# Patient Record
Sex: FEMALE | Race: WHITE | Employment: FULL TIME | URBAN - METROPOLITAN AREA
[De-identification: names, ages, dates, MRNs, and addresses within clinical notes are randomized per-mention and may not be internally consistent; named-entity substitution may affect disease eponyms.]

---

## 2019-06-11 ENCOUNTER — HOSPITAL ENCOUNTER (OUTPATIENT)
Dept: SURGERY | Age: 49
Discharge: HOME | End: 2019-06-11
Payer: COMMERCIAL

## 2019-06-11 VITALS
WEIGHT: 194 LBS | RESPIRATION RATE: 18 BRPM | TEMPERATURE: 98.4 F | HEIGHT: 66 IN | SYSTOLIC BLOOD PRESSURE: 141 MMHG | BODY MASS INDEX: 31.18 KG/M2 | DIASTOLIC BLOOD PRESSURE: 80 MMHG | HEART RATE: 86 BPM | OXYGEN SATURATION: 100 %

## 2019-06-11 LAB
ABO + RH BLD: NORMAL
APPEARANCE UR: CLEAR
BACTERIA URNS QL MICRO: NEGATIVE /HPF
BILIRUB UR QL: NEGATIVE
BLOOD GROUP ANTIBODIES SERPL: NORMAL
COLOR UR: ABNORMAL
EPITH CASTS URNS QL MICRO: ABNORMAL /LPF
ERYTHROCYTE [DISTWIDTH] IN BLOOD BY AUTOMATED COUNT: 14 % (ref 11.5–14.5)
GLUCOSE UR STRIP.AUTO-MCNC: NEGATIVE MG/DL
HCT VFR BLD AUTO: 31.4 % (ref 35–47)
HGB BLD-MCNC: 9.5 G/DL (ref 11.5–16)
HGB UR QL STRIP: ABNORMAL
HYALINE CASTS URNS QL MICRO: ABNORMAL /LPF (ref 0–5)
KETONES UR QL STRIP.AUTO: NEGATIVE MG/DL
LEUKOCYTE ESTERASE UR QL STRIP.AUTO: NEGATIVE
MCH RBC QN AUTO: 24.9 PG (ref 26–34)
MCHC RBC AUTO-ENTMCNC: 30.3 G/DL (ref 30–36.5)
MCV RBC AUTO: 82.4 FL (ref 80–99)
NITRITE UR QL STRIP.AUTO: NEGATIVE
NRBC # BLD: 0 K/UL (ref 0–0.01)
NRBC BLD-RTO: 0 PER 100 WBC
PH UR STRIP: 6 [PH] (ref 5–8)
PLATELET # BLD AUTO: 395 K/UL (ref 150–400)
PMV BLD AUTO: 9.8 FL (ref 8.9–12.9)
PROT UR STRIP-MCNC: NEGATIVE MG/DL
RBC # BLD AUTO: 3.81 M/UL (ref 3.8–5.2)
RBC #/AREA URNS HPF: ABNORMAL /HPF (ref 0–5)
SP GR UR REFRACTOMETRY: 1.01 (ref 1–1.03)
SPECIMEN EXP DATE BLD: NORMAL
UA: UC IF INDICATED,UAUC: ABNORMAL
UROBILINOGEN UR QL STRIP.AUTO: 0.2 EU/DL (ref 0.2–1)
WBC # BLD AUTO: 6.5 K/UL (ref 3.6–11)
WBC URNS QL MICRO: ABNORMAL /HPF (ref 0–4)

## 2019-06-11 PROCEDURE — 36415 COLL VENOUS BLD VENIPUNCTURE: CPT

## 2019-06-11 PROCEDURE — 81001 URINALYSIS AUTO W/SCOPE: CPT

## 2019-06-11 PROCEDURE — 85027 COMPLETE CBC AUTOMATED: CPT

## 2019-06-11 PROCEDURE — 86900 BLOOD TYPING SEROLOGIC ABO: CPT

## 2019-06-11 RX ORDER — MEDROXYPROGESTERONE ACETATE 10 MG/1
20 TABLET ORAL 2 TIMES DAILY
COMMUNITY
End: 2019-06-22

## 2019-06-11 RX ORDER — ALPRAZOLAM 0.5 MG/1
0.5 TABLET ORAL
COMMUNITY

## 2019-06-11 NOTE — PERIOP NOTES
[facility]  Ambulatory Surgery Unit  Pre-operative Instructions    Surgery/Procedure Date  06/21/19     Tentative Arrival Time 1020    1. On the day of your surgery/procedure, please report to the Ambulatory Surgery Unit Registration Desk and sign in at your designated time. The Ambulatory Surgery Unit is located in [facility] on the [facility] side of the [facility] across from the [address] [place] [address]. Please have all of your health insurance cards and a photo ID. 2. You must have someone with you to drive you home, as you should not drive a car for 24 hours following anesthesia. Please make arrangements for a responsible adult friend or family member to stay with you for at least the first 24 hours after your surgery. 3. Do not have anything to eat or drink (including water, gum, mints, coffee, juice) after 11:59 PM  06/20/19. This may not apply to medications prescribed by your physician. (Please note below the special instructions with medications to take the morning of surgery, if applicable.)    4. We recommend you do not drink any alcoholic beverages for 24 hours before and after your surgery. 5. Contact your surgeons office for instructions on the following medications: non-steroidal anti-inflammatory drugs (i.e. Advil, Aleve), vitamins, and supplements. (Some surgeons will want you to stop these medications prior to surgery and others may allow you to take them)   **If you are currently taking Plavix, Coumadin, Aspirin and/or other blood-thinning agents, contact your surgeon for instructions. ** Your surgeon will partner with the physician prescribing these medications to determine if it is safe to stop or if you need to continue taking. Please do not stop taking these medications without instructions from your surgeon.     6. In an effort to help prevent surgical site infection, we ask that you shower with an anti-bacterial soap (i.e. Dial/Safeguard, or the soap provided to you at your preadmission testing appointment) for 3 days prior to and on the morning of surgery, using a fresh towel after each shower. (Please begin this process with fresh bed linens.) Do not apply any lotions, powders, or deodorants after the shower on the day of your procedure. If applicable, please do not shave the operative site for 48 hours prior to surgery. 7. Wear comfortable clothes. Wear glasses instead of contacts. Do not bring any jewelry or money (other than copays or fees as instructed). Do not wear make-up, particularly mascara, the morning of your surgery. Do not wear nail polish, particularly if you are having foot /hand surgery. Wear your hair loose or down, no ponytails, buns, lor pins or clips. All body piercings must be removed. 8. You should understand that if you do not follow these instructions your surgery may be cancelled. If your physical condition changes (i.e. fever, cold or flu) please contact your surgeon as soon as possible. 9. It is important that you be on time. If a situation occurs where you may be late, or if you have any questions or problems, please call [phone].    10. Your surgery time may be subject to change. You will receive a phone call the day prior to surgery to confirm your arrival time. 11. Pediatric patients: please bring a change of clothes, diapers, bottle/sippy cup, pacifier, etc.      Special Instructions: Take all medications and inhalers, as prescribed, on the morning of surgery with a sip of water EXCEPT: lisinopril            I understand a pre-operative phone call will be made to verify my surgery time. In the event that I am not available, I give permission for a message to be left on my answering service and/or with another person?       yes         ___________________      ___________________      ________________  (Signature of Patient)          (Witness)                   (Date and Time)

## 2019-06-20 ENCOUNTER — ANESTHESIA EVENT (OUTPATIENT)
Dept: SURGERY | Age: 49
End: 2019-06-20
Payer: COMMERCIAL

## 2019-06-21 ENCOUNTER — ANESTHESIA (OUTPATIENT)
Dept: SURGERY | Age: 49
End: 2019-06-21
Payer: COMMERCIAL

## 2019-06-21 ENCOUNTER — HOSPITAL ENCOUNTER (OUTPATIENT)
Age: 49
Setting detail: OBSERVATION
Discharge: HOME | End: 2019-06-22
Attending: OBSTETRICS & GYNECOLOGY | Admitting: OBSTETRICS & GYNECOLOGY
Payer: COMMERCIAL

## 2019-06-21 DIAGNOSIS — Z90.710 S/P LAPAROSCOPIC HYSTERECTOMY: Primary | ICD-10-CM

## 2019-06-21 PROBLEM — D21.9 FIBROIDS: Status: ACTIVE | Noted: 2019-06-21

## 2019-06-21 LAB — HCG UR QL: NEGATIVE

## 2019-06-21 PROCEDURE — 77030008756 HC TU IRR SUC STRY -B: Performed by: OBSTETRICS & GYNECOLOGY

## 2019-06-21 PROCEDURE — 77030019908 HC STETH ESOPH SIMS -A: Performed by: ANESTHESIOLOGY

## 2019-06-21 PROCEDURE — 76210000037 HC AMBSU PH I REC 2 TO 2.5 HR: Performed by: OBSTETRICS & GYNECOLOGY

## 2019-06-21 PROCEDURE — 77030039266 HC ADH SKN EXOFIN S2SG -A: Performed by: OBSTETRICS & GYNECOLOGY

## 2019-06-21 PROCEDURE — 77030002933 HC SUT MCRYL J&J -A: Performed by: OBSTETRICS & GYNECOLOGY

## 2019-06-21 PROCEDURE — 77030018684: Performed by: OBSTETRICS & GYNECOLOGY

## 2019-06-21 PROCEDURE — 74011000250 HC RX REV CODE- 250

## 2019-06-21 PROCEDURE — 77030008771 HC TU NG SALEM SUMP -A: Performed by: ANESTHESIOLOGY

## 2019-06-21 PROCEDURE — 74011250636 HC RX REV CODE- 250/636: Performed by: OBSTETRICS & GYNECOLOGY

## 2019-06-21 PROCEDURE — 74011250636 HC RX REV CODE- 250/636

## 2019-06-21 PROCEDURE — 74011250637 HC RX REV CODE- 250/637: Performed by: OBSTETRICS & GYNECOLOGY

## 2019-06-21 PROCEDURE — 77030013079 HC BLNKT BAIR HGGR 3M -A: Performed by: ANESTHESIOLOGY

## 2019-06-21 PROCEDURE — 77030034850: Performed by: OBSTETRICS & GYNECOLOGY

## 2019-06-21 PROCEDURE — 77030010032 HC SCLPL DISECT HARM J&J -C: Performed by: OBSTETRICS & GYNECOLOGY

## 2019-06-21 PROCEDURE — 77030008684 HC TU ET CUF COVD -B: Performed by: ANESTHESIOLOGY

## 2019-06-21 PROCEDURE — 77030038613 HC SUT PDS STRATA SPIRL J&J -B: Performed by: OBSTETRICS & GYNECOLOGY

## 2019-06-21 PROCEDURE — 77030020702 HC ADAPT HARM DISP J&J -B: Performed by: OBSTETRICS & GYNECOLOGY

## 2019-06-21 PROCEDURE — 99218 HC RM OBSERVATION: CPT

## 2019-06-21 PROCEDURE — 77030025625 HC DEV TISS SEAL J&J -F: Performed by: OBSTETRICS & GYNECOLOGY

## 2019-06-21 PROCEDURE — 77030016151 HC PROTCTR LNS DFOG COVD -B: Performed by: OBSTETRICS & GYNECOLOGY

## 2019-06-21 PROCEDURE — 81025 URINE PREGNANCY TEST: CPT

## 2019-06-21 PROCEDURE — 77030020263 HC SOL INJ SOD CL0.9% LFCR 1000ML: Performed by: OBSTETRICS & GYNECOLOGY

## 2019-06-21 PROCEDURE — 77030018836 HC SOL IRR NACL ICUM -A: Performed by: OBSTETRICS & GYNECOLOGY

## 2019-06-21 PROCEDURE — 74011000250 HC RX REV CODE- 250: Performed by: OBSTETRICS & GYNECOLOGY

## 2019-06-21 PROCEDURE — 77030026438 HC STYL ET INTUB CARD -A: Performed by: ANESTHESIOLOGY

## 2019-06-21 PROCEDURE — 88307 TISSUE EXAM BY PATHOLOGIST: CPT

## 2019-06-21 PROCEDURE — 77030018778 HC MANIP UTER VCAR CNMD -B: Performed by: OBSTETRICS & GYNECOLOGY

## 2019-06-21 PROCEDURE — 74011250636 HC RX REV CODE- 250/636: Performed by: ANESTHESIOLOGY

## 2019-06-21 PROCEDURE — 76060000064 HC AMB SURG ANES 2 TO 2.5 HR: Performed by: OBSTETRICS & GYNECOLOGY

## 2019-06-21 PROCEDURE — 76030000004 HC AMB SURG OR TIME 2 TO 2.5: Performed by: OBSTETRICS & GYNECOLOGY

## 2019-06-21 PROCEDURE — 77030008606 HC TRCR ENDOSC KII AMR -B: Performed by: OBSTETRICS & GYNECOLOGY

## 2019-06-21 PROCEDURE — 74011250637 HC RX REV CODE- 250/637

## 2019-06-21 PROCEDURE — 77030020255 HC SOL INJ LR 1000ML BG: Performed by: OBSTETRICS & GYNECOLOGY

## 2019-06-21 RX ORDER — SODIUM CHLORIDE 0.9 % (FLUSH) 0.9 %
5-40 SYRINGE (ML) INJECTION AS NEEDED
Status: DISCONTINUED | OUTPATIENT
Start: 2019-06-21 | End: 2019-06-22 | Stop reason: HOSPADM

## 2019-06-21 RX ORDER — ALPRAZOLAM 0.5 MG/1
0.5 TABLET ORAL
Status: DISCONTINUED | OUTPATIENT
Start: 2019-06-21 | End: 2019-06-22 | Stop reason: HOSPADM

## 2019-06-21 RX ORDER — SODIUM CHLORIDE 0.9 % (FLUSH) 0.9 %
5-40 SYRINGE (ML) INJECTION EVERY 8 HOURS
Status: DISCONTINUED | OUTPATIENT
Start: 2019-06-21 | End: 2019-06-22 | Stop reason: HOSPADM

## 2019-06-21 RX ORDER — NEOSTIGMINE METHYLSULFATE 1 MG/ML
INJECTION INTRAVENOUS AS NEEDED
Status: DISCONTINUED | OUTPATIENT
Start: 2019-06-21 | End: 2019-06-21 | Stop reason: HOSPADM

## 2019-06-21 RX ORDER — MORPHINE SULFATE 10 MG/ML
2 INJECTION, SOLUTION INTRAMUSCULAR; INTRAVENOUS
Status: DISCONTINUED | OUTPATIENT
Start: 2019-06-21 | End: 2019-06-21 | Stop reason: HOSPADM

## 2019-06-21 RX ORDER — DIPHENHYDRAMINE HCL 25 MG
25 CAPSULE ORAL
Status: DISCONTINUED | OUTPATIENT
Start: 2019-06-21 | End: 2019-06-22 | Stop reason: HOSPADM

## 2019-06-21 RX ORDER — SODIUM CHLORIDE 0.9 % (FLUSH) 0.9 %
5-40 SYRINGE (ML) INJECTION EVERY 8 HOURS
Status: DISCONTINUED | OUTPATIENT
Start: 2019-06-21 | End: 2019-06-21 | Stop reason: HOSPADM

## 2019-06-21 RX ORDER — ACETAMINOPHEN 10 MG/ML
INJECTION, SOLUTION INTRAVENOUS
Status: COMPLETED
Start: 2019-06-21 | End: 2019-06-21

## 2019-06-21 RX ORDER — FENTANYL CITRATE 50 UG/ML
25 INJECTION, SOLUTION INTRAMUSCULAR; INTRAVENOUS
Status: DISCONTINUED | OUTPATIENT
Start: 2019-06-21 | End: 2019-06-21 | Stop reason: HOSPADM

## 2019-06-21 RX ORDER — LIDOCAINE HYDROCHLORIDE 10 MG/ML
0.1 INJECTION, SOLUTION EPIDURAL; INFILTRATION; INTRACAUDAL; PERINEURAL AS NEEDED
Status: DISCONTINUED | OUTPATIENT
Start: 2019-06-21 | End: 2019-06-21 | Stop reason: HOSPADM

## 2019-06-21 RX ORDER — PROPOFOL 10 MG/ML
INJECTION, EMULSION INTRAVENOUS AS NEEDED
Status: DISCONTINUED | OUTPATIENT
Start: 2019-06-21 | End: 2019-06-21 | Stop reason: HOSPADM

## 2019-06-21 RX ORDER — NALOXONE HYDROCHLORIDE 0.4 MG/ML
0.2 INJECTION, SOLUTION INTRAMUSCULAR; INTRAVENOUS; SUBCUTANEOUS AS NEEDED
Status: DISCONTINUED | OUTPATIENT
Start: 2019-06-21 | End: 2019-06-22 | Stop reason: HOSPADM

## 2019-06-21 RX ORDER — EPHEDRINE SULFATE/0.9% NACL/PF 50 MG/5 ML
SYRINGE (ML) INTRAVENOUS AS NEEDED
Status: DISCONTINUED | OUTPATIENT
Start: 2019-06-21 | End: 2019-06-21 | Stop reason: HOSPADM

## 2019-06-21 RX ORDER — SODIUM CHLORIDE, SODIUM LACTATE, POTASSIUM CHLORIDE, CALCIUM CHLORIDE 600; 310; 30; 20 MG/100ML; MG/100ML; MG/100ML; MG/100ML
25 INJECTION, SOLUTION INTRAVENOUS CONTINUOUS
Status: DISCONTINUED | OUTPATIENT
Start: 2019-06-21 | End: 2019-06-21 | Stop reason: HOSPADM

## 2019-06-21 RX ORDER — DIPHENHYDRAMINE HYDROCHLORIDE 50 MG/ML
12.5 INJECTION, SOLUTION INTRAMUSCULAR; INTRAVENOUS AS NEEDED
Status: DISCONTINUED | OUTPATIENT
Start: 2019-06-21 | End: 2019-06-21 | Stop reason: HOSPADM

## 2019-06-21 RX ORDER — SUCCINYLCHOLINE CHLORIDE 20 MG/ML
INJECTION INTRAMUSCULAR; INTRAVENOUS AS NEEDED
Status: DISCONTINUED | OUTPATIENT
Start: 2019-06-21 | End: 2019-06-21 | Stop reason: HOSPADM

## 2019-06-21 RX ORDER — GLYCOPYRROLATE 0.2 MG/ML
INJECTION INTRAMUSCULAR; INTRAVENOUS AS NEEDED
Status: DISCONTINUED | OUTPATIENT
Start: 2019-06-21 | End: 2019-06-21 | Stop reason: HOSPADM

## 2019-06-21 RX ORDER — SCOLOPAMINE TRANSDERMAL SYSTEM 1 MG/1
PATCH, EXTENDED RELEASE TRANSDERMAL
Status: COMPLETED
Start: 2019-06-21 | End: 2019-06-21

## 2019-06-21 RX ORDER — HYDROMORPHONE HYDROCHLORIDE 1 MG/ML
.2-.5 INJECTION, SOLUTION INTRAMUSCULAR; INTRAVENOUS; SUBCUTANEOUS ONCE
Status: DISCONTINUED | OUTPATIENT
Start: 2019-06-21 | End: 2019-06-21 | Stop reason: HOSPADM

## 2019-06-21 RX ORDER — ZOLPIDEM TARTRATE 5 MG/1
5 TABLET ORAL
Status: DISCONTINUED | OUTPATIENT
Start: 2019-06-21 | End: 2019-06-22 | Stop reason: HOSPADM

## 2019-06-21 RX ORDER — FENTANYL CITRATE 50 UG/ML
INJECTION, SOLUTION INTRAMUSCULAR; INTRAVENOUS AS NEEDED
Status: DISCONTINUED | OUTPATIENT
Start: 2019-06-21 | End: 2019-06-21 | Stop reason: HOSPADM

## 2019-06-21 RX ORDER — OXYCODONE AND ACETAMINOPHEN 5; 325 MG/1; MG/1
1 TABLET ORAL
Status: DISCONTINUED | OUTPATIENT
Start: 2019-06-21 | End: 2019-06-22 | Stop reason: HOSPADM

## 2019-06-21 RX ORDER — SODIUM CHLORIDE 0.9 % (FLUSH) 0.9 %
5-40 SYRINGE (ML) INJECTION AS NEEDED
Status: DISCONTINUED | OUTPATIENT
Start: 2019-06-21 | End: 2019-06-21 | Stop reason: HOSPADM

## 2019-06-21 RX ORDER — KETOROLAC TROMETHAMINE 30 MG/ML
INJECTION, SOLUTION INTRAMUSCULAR; INTRAVENOUS
Status: DISPENSED
Start: 2019-06-21 | End: 2019-06-21

## 2019-06-21 RX ORDER — ONDANSETRON 2 MG/ML
INJECTION INTRAMUSCULAR; INTRAVENOUS AS NEEDED
Status: DISCONTINUED | OUTPATIENT
Start: 2019-06-21 | End: 2019-06-21 | Stop reason: HOSPADM

## 2019-06-21 RX ORDER — HYDROMORPHONE HYDROCHLORIDE 2 MG/ML
INJECTION, SOLUTION INTRAMUSCULAR; INTRAVENOUS; SUBCUTANEOUS AS NEEDED
Status: DISCONTINUED | OUTPATIENT
Start: 2019-06-21 | End: 2019-06-21 | Stop reason: HOSPADM

## 2019-06-21 RX ORDER — OXYCODONE AND ACETAMINOPHEN 5; 325 MG/1; MG/1
1 TABLET ORAL
Status: DISCONTINUED | OUTPATIENT
Start: 2019-06-21 | End: 2019-06-21 | Stop reason: HOSPADM

## 2019-06-21 RX ORDER — LIDOCAINE HYDROCHLORIDE 20 MG/ML
INJECTION, SOLUTION EPIDURAL; INFILTRATION; INTRACAUDAL; PERINEURAL AS NEEDED
Status: DISCONTINUED | OUTPATIENT
Start: 2019-06-21 | End: 2019-06-21 | Stop reason: HOSPADM

## 2019-06-21 RX ORDER — KETOROLAC TROMETHAMINE 30 MG/ML
30 INJECTION, SOLUTION INTRAMUSCULAR; INTRAVENOUS
Status: COMPLETED | OUTPATIENT
Start: 2019-06-21 | End: 2019-06-21

## 2019-06-21 RX ORDER — SCOLOPAMINE TRANSDERMAL SYSTEM 1 MG/1
1 PATCH, EXTENDED RELEASE TRANSDERMAL ONCE
Status: DISCONTINUED | OUTPATIENT
Start: 2019-06-21 | End: 2019-06-21 | Stop reason: HOSPADM

## 2019-06-21 RX ORDER — IBUPROFEN 400 MG/1
800 TABLET ORAL EVERY 8 HOURS
Status: DISCONTINUED | OUTPATIENT
Start: 2019-06-21 | End: 2019-06-22 | Stop reason: HOSPADM

## 2019-06-21 RX ORDER — PROCHLORPERAZINE EDISYLATE 5 MG/ML
5 INJECTION INTRAMUSCULAR; INTRAVENOUS
Status: DISCONTINUED | OUTPATIENT
Start: 2019-06-21 | End: 2019-06-22 | Stop reason: HOSPADM

## 2019-06-21 RX ORDER — LISINOPRIL 5 MG/1
10 TABLET ORAL DAILY
Status: DISCONTINUED | OUTPATIENT
Start: 2019-06-22 | End: 2019-06-22 | Stop reason: HOSPADM

## 2019-06-21 RX ORDER — ACETAMINOPHEN 10 MG/ML
1000 INJECTION, SOLUTION INTRAVENOUS ONCE
Status: COMPLETED | OUTPATIENT
Start: 2019-06-21 | End: 2019-06-21

## 2019-06-21 RX ORDER — CEFAZOLIN SODIUM/WATER 2 G/20 ML
2 SYRINGE (ML) INTRAVENOUS ONCE
Status: DISCONTINUED | OUTPATIENT
Start: 2019-06-21 | End: 2019-06-21 | Stop reason: HOSPADM

## 2019-06-21 RX ORDER — BUPIVACAINE HYDROCHLORIDE AND EPINEPHRINE 5; 5 MG/ML; UG/ML
INJECTION, SOLUTION EPIDURAL; INTRACAUDAL; PERINEURAL AS NEEDED
Status: DISCONTINUED | OUTPATIENT
Start: 2019-06-21 | End: 2019-06-21 | Stop reason: HOSPADM

## 2019-06-21 RX ORDER — ROCURONIUM BROMIDE 10 MG/ML
INJECTION, SOLUTION INTRAVENOUS AS NEEDED
Status: DISCONTINUED | OUTPATIENT
Start: 2019-06-21 | End: 2019-06-21 | Stop reason: HOSPADM

## 2019-06-21 RX ORDER — CEFAZOLIN SODIUM 1 G/3ML
INJECTION, POWDER, FOR SOLUTION INTRAMUSCULAR; INTRAVENOUS AS NEEDED
Status: DISCONTINUED | OUTPATIENT
Start: 2019-06-21 | End: 2019-06-21 | Stop reason: HOSPADM

## 2019-06-21 RX ORDER — MIDAZOLAM HYDROCHLORIDE 1 MG/ML
INJECTION, SOLUTION INTRAMUSCULAR; INTRAVENOUS AS NEEDED
Status: DISCONTINUED | OUTPATIENT
Start: 2019-06-21 | End: 2019-06-21 | Stop reason: HOSPADM

## 2019-06-21 RX ADMIN — ACETAMINOPHEN 1000 MG: 10 INJECTION, SOLUTION INTRAVENOUS at 06:59

## 2019-06-21 RX ADMIN — SUCCINYLCHOLINE CHLORIDE 160 MG: 20 INJECTION INTRAMUSCULAR; INTRAVENOUS at 07:35

## 2019-06-21 RX ADMIN — Medication 10 ML: at 22:17

## 2019-06-21 RX ADMIN — OXYCODONE AND ACETAMINOPHEN 1 TABLET: 5; 325 TABLET ORAL at 22:16

## 2019-06-21 RX ADMIN — Medication 20 MG: at 08:22

## 2019-06-21 RX ADMIN — GLYCOPYRROLATE 0.5 MG: 0.2 INJECTION INTRAMUSCULAR; INTRAVENOUS at 09:29

## 2019-06-21 RX ADMIN — KETOROLAC TROMETHAMINE 30 MG: 30 INJECTION, SOLUTION INTRAMUSCULAR at 10:04

## 2019-06-21 RX ADMIN — IBUPROFEN 800 MG: 400 TABLET, FILM COATED ORAL at 12:45

## 2019-06-21 RX ADMIN — HYDROMORPHONE HYDROCHLORIDE 0.2 MG: 2 INJECTION, SOLUTION INTRAMUSCULAR; INTRAVENOUS; SUBCUTANEOUS at 09:30

## 2019-06-21 RX ADMIN — SODIUM CHLORIDE, SODIUM LACTATE, POTASSIUM CHLORIDE, AND CALCIUM CHLORIDE 25 ML/HR: 600; 310; 30; 20 INJECTION, SOLUTION INTRAVENOUS at 06:59

## 2019-06-21 RX ADMIN — ROCURONIUM BROMIDE 10 MG: 10 INJECTION, SOLUTION INTRAVENOUS at 07:33

## 2019-06-21 RX ADMIN — MEPERIDINE HYDROCHLORIDE 12.5 MG: 25 INJECTION, SOLUTION INTRAMUSCULAR; INTRAVENOUS; SUBCUTANEOUS at 10:59

## 2019-06-21 RX ADMIN — ROCURONIUM BROMIDE 10 MG: 10 INJECTION, SOLUTION INTRAVENOUS at 08:17

## 2019-06-21 RX ADMIN — CEFAZOLIN SODIUM 2 G: 1 INJECTION, POWDER, FOR SOLUTION INTRAMUSCULAR; INTRAVENOUS at 07:35

## 2019-06-21 RX ADMIN — FENTANYL CITRATE 25 MCG: 50 INJECTION, SOLUTION INTRAMUSCULAR; INTRAVENOUS at 10:07

## 2019-06-21 RX ADMIN — FENTANYL CITRATE 100 MCG: 50 INJECTION, SOLUTION INTRAMUSCULAR; INTRAVENOUS at 07:33

## 2019-06-21 RX ADMIN — IBUPROFEN 800 MG: 400 TABLET, FILM COATED ORAL at 18:09

## 2019-06-21 RX ADMIN — ZOLPIDEM TARTRATE 5 MG: 5 TABLET ORAL at 22:16

## 2019-06-21 RX ADMIN — HYDROMORPHONE HYDROCHLORIDE 0.4 MG: 2 INJECTION, SOLUTION INTRAMUSCULAR; INTRAVENOUS; SUBCUTANEOUS at 07:44

## 2019-06-21 RX ADMIN — OXYCODONE AND ACETAMINOPHEN 1 TABLET: 5; 325 TABLET ORAL at 17:28

## 2019-06-21 RX ADMIN — OXYCODONE AND ACETAMINOPHEN 1 TABLET: 5; 325 TABLET ORAL at 12:45

## 2019-06-21 RX ADMIN — ONDANSETRON 4 MG: 2 INJECTION INTRAMUSCULAR; INTRAVENOUS at 07:51

## 2019-06-21 RX ADMIN — Medication 10 ML: at 10:58

## 2019-06-21 RX ADMIN — LIDOCAINE HYDROCHLORIDE 20 MG: 20 INJECTION, SOLUTION EPIDURAL; INFILTRATION; INTRACAUDAL; PERINEURAL at 07:33

## 2019-06-21 RX ADMIN — MIDAZOLAM HYDROCHLORIDE 2 MG: 1 INJECTION, SOLUTION INTRAMUSCULAR; INTRAVENOUS at 07:27

## 2019-06-21 RX ADMIN — NEOSTIGMINE METHYLSULFATE 3 MG: 1 INJECTION INTRAVENOUS at 09:29

## 2019-06-21 RX ADMIN — ROCURONIUM BROMIDE 30 MG: 10 INJECTION, SOLUTION INTRAVENOUS at 07:51

## 2019-06-21 RX ADMIN — FENTANYL CITRATE 25 MCG: 50 INJECTION, SOLUTION INTRAMUSCULAR; INTRAVENOUS at 10:14

## 2019-06-21 RX ADMIN — ALPRAZOLAM 0.5 MG: 0.5 TABLET ORAL at 18:10

## 2019-06-21 RX ADMIN — PROPOFOL 150 MG: 10 INJECTION, EMULSION INTRAVENOUS at 07:35

## 2019-06-21 NOTE — PERIOP NOTES
Dannielle Alexander  1970  [id number]    Situation:  Verbal report given from: Silke Gates CRNA  Procedure: Procedure(s):  TOTAL LAPAROSCOPIC HYSTERECTOMY AND BILATERAL SALPINGECTOMY    Background:    Preoperative diagnosis: FIBROIDS, MENORRHAGIA    Postoperative diagnosis: FIBROIDS, MENORRHAGIA    :  Dr. Gris Maldonado    Assistant(s): Circ-1: Emperatriz Aguilar RN  Scrub Tech-1: Laura Parikh  Scrub Tech-2: Rehana Angulo    Specimens:   ID Type Source Tests Collected by Time Destination   1 : Uterus, cervix, bilateral fallopian tubes Preservative Uterus with Bilateral Fallopian Tubes  Cee Thayer MD 6/21/2019 0932 Pathology       Assessment:  Intra-procedure medications         Anesthesia gave intra-procedure sedation and medications, see anesthesia flow sheet     Intravenous fluids: LR@ KVO     Vital signs stable       Recommendation:    Permission to share finding with Emilio Campbell : yes

## 2019-06-21 NOTE — PERIOP NOTES
Permission received to review discharge instructions and discuss private health information with  Domingo Randhawa

## 2019-06-21 NOTE — H&P
Pre-operative Evaluation / History & Physical    Sent From: Sent To: [address]   Phone: [phone] Fax: [phone]      Patient Information  Patient Name Franca Argueta Sex F    1970 Age 51yo   Address [address]  [place], [place] Phone H: [phone]  W: [phone]  M: [phone]   Primary Insurance [employer] (PPO)  ID: [id number]  Group: [id number]  Policy Gill: Raimundo PAUL None recorded. Pre-Op Visit and Medical History  Chief Complaint surgical consult -hyst   History of Present Illness Patient presents for surgical consult for hyst per menorrhagia/fibroids/polyps, reporting bleeding with variable flow since December. Multiple 3-5 cm fibroids and polyp noted on biopsy. Past Medical History Discussed Past Medical History  Anemia: Y  Anxiety Disorder: Y  Dermatologic Disorder: Y - rosacea  Hypertension (high blood pressure): Y  Pulmonary / Lung Disease: Y - sarcoidosis   Surgical History Reviewed Surgical History     Caesarean Section   Cholecystectomy (Gallbladder)   Tonsillectomy   Gynecological / Obstetrical History Reviewed GYN History  Date of LMP: 2019 (Notes: irregular). Duration of Flow (days): 5. Flow: Heavy. Menses Monthly: Y. Menstrual Cramps: moderate. Date of Last Pap Smear: 10/25/2018 (Notes: ASCUS). Date of HPV testing: 10/25/2018 (Notes: negative). Abnormal Pap: N. Sexually Active?: Y.  STIs/STDs: N.  Current Birth Control Method: Condoms. Date of Last Mammogram: 10/25/2018 (Notes: BIRADS 1). Mammogram Result: (Notes: 2018 mammo TC 21%). Fibroids: Yes (Notes: uterine fibroids & polyps).    Social History Discussed Social History  OB/GYN Social History  Smoking Status: Never smoker  Marital status:   Are you working: Yes  Occupation: [employer]  On average, how many days per week do you engage in moderate to strenuous EXERCISE (like walking fast, running, jogging, dancing, swimming, biking, or other activities that cause a light or heavy sweat)? : 3  works retail - cosmetics   Family History Discussed Family History    Maternal Grandmother                  - Malignant tumor of breast                    Allergies List Reviewed Allergies     MINOCYCLINE    PREDNISONE       Medications Reviewed Medications     Allergy Sinus Headache (PE) 02/12/19   entered    Augmentin  Internal Note: sinus infection 02/28/19   entered    Aviane 0.1 mg-20 mcg tablet  Take 1 tablet(s) every day by oral route. 12/20/18   prescribed    ferrous sulfate 325 mg (65 mg iron) tablet  1 - 2 po daily, start 04/10/2013 04/10/13   filled    lisinopril 10 mg tablet  Prescribed by non 606/706 Saw Duong MD, start 05/13/2014 05/13/14   filled    Xanax 0.5 mg tablet  Take 1 tablet(s) as needed by oral route. 11/27/18   entered       Review of Systems ROS as noted in the HPI   Vital Signs Ht:                  5 ft 5.5 in (166.37 cm) 02/28/2019 02:02 pm Wt:                  191 lbs (86.64 kg) 02/28/2019 02:03 pm BMI:                  31.3 02/28/2019 02:03 pm   BP:                  128/84 02/28/2019 02:08 pm          Physical Exam Patient is a 48-year-old female. Constitutional: General Appearance: well developed and nourished and pleasant. Level of Distress: no acute distress. Ambulation: ambulating normally. Head: Head: normal scalp and examination of the face and normocephalic, atraumatic, and no temporal wasting. Eyes: External Eye no discharge or eye discharge, proptosis, or ptosis. Sclera: non-icteric. Extraocular Movements extraocular movements intact. Ears, Nose, Mouth, Throat: Ears normal hearing. Nose: no external nose lesions. Neck: Appearance trachea midline. Thyroid: no enlargement. Lungs / Chest: Respiratory effort: unlabored. Cardiovascular: Extremities: no clubbing, cyanosis, or edema.     Abdomen: Inspection and Palpation: no tenderness, guarding, masses, or rebound tenderness and soft and non-distended. Hernia: none palpable. Lymphatics: General Lymphatics: no lymphadenopathy. Extremities: Extremities: no swelling or inflammation of extremities. Musculoskeletal System: General Musculoskeletal full range of motion. Skin: General Skin no rash or suspicious lesions. Neurologic: Gait and Station: normal gait and station. Cranial Nerves: grossly intact. Mental Status Exam: Orientation oriented to person, place, and time. Affect: appropriate affect. Language and Speech: normal speech and comprehension. Lab Results    Assessment and Plan 1. Uterine leiomyoma -  Desires definitive management. We will plan total laparoscopic hysterectomy. We discussed the risks and benefits of ovarian conservation and the pt would like to have her ovaries left in place as long as they are normal. She understands that oopherectomy would mean immediate menopause. We also discussed supracervical vs total laparoscopic hyst, and she would like to proceed with the total procedure. She understands that if surgical complications necessitate supracervical hyst, that there is a risk of cyclic menstrual bleeding. I reviewed with the patient indications, alternatives, risks, and benefits of proposed procedure, the risks of which include injury to bowel, bladder, nerves, blood vessels, or ureters, injury to any other intraabdominal structure, risk of bleeding and infection, and inherent risks of anesthesia, including death. The patient indicates understanding of these risks, and agrees to the proposed procedure. She is instructed to stay NPO after midnight the night before surgery.   D25.9: Leiomyoma of uterus, unspecified      Return to Office  None recorded   Current Problems (Diagnoses) Reviewed Problems     Uterine leiomyoma - Onset: 11/27/2018   Hyperthyroidism - Onset: 02/12/2019   Polyp of corpus uteri - Onset: 01/28/2019   Menorrhagia - Onset: 06/17/2015   Gynecologic examination - Onset: 11/27/2018   Discharge from left nipple - Onset: 02/12/2019         Electronically Signed by: Estrellita Snellen, MD    _____________________________________________   Ordered/Documented by:   Visit Date: 02/28/2019    The History and Physical is reviewed today. The patient is seen and examined and no changes are required. The pt would like to stay in house tonight because her short term disability won't kick in right away otherwise.    Yaz Kuhn MD  6/21/2019  7:22 AM

## 2019-06-21 NOTE — PERIOP NOTES
0951-Pt. To pacu, sleepy but arousable. Vss. Denies pain but c/o pressure and urge to void. Explained to pt. That after catheter is removed the urge to void is normal.  Abdomen w/3 trocar sites w/skin glue intact. peripad w/scant serosanguinous drainage. 1007-Pt. C/o pressure and cramping to abdomen level 5/10. Warm blanket applied to abdomen. Medicated w/toradol and 25mcg fentanyl. Will monitor. 1014-Pt. States pain is still 5/10 and now cramping in back. Medicated w/fentanyl 25mcg iv. Will monitor. 1020-Pt. Resting w/eyes closed. States pain is now down to a 3 or 4. Will monitor. 1045-Pt. States cramping is better, now 3/10. Still feels the urge to void. Placed on bedpan. 1055-Pt. Unable to void at this time. 1059-Pt. C/o increasing cramps, level 5/10. Medicated w/demerol 12.5mg iv. Will monitor.  at bedside. 1115-Pt. States pain is better, now 3/10. Will monitor.

## 2019-06-21 NOTE — PERIOP NOTES
Patient states that  Aliyah Runner will be with them for at least 24 hours following today's procedure.

## 2019-06-21 NOTE — PERIOP NOTES
Pt. Transported to room 2114 via stretcher w/all belongings. Assisted to bathroom to attempt to void, unsuccessful. Bedside report given to [facility].

## 2019-06-21 NOTE — OP NOTES
Operative Note    Patient ID:   Name: Didi Mac Record Number: [id number]    YOB: 1970    Preoperative Diagnosis: FIBROIDS, MENORRHAGIA    Postoperative Diagnosis: FIBROIDS, MENORRHAGIA    Surgeon:  Mary Jane Whitt MD     Assistant:  OR assistant    Anesthesia: General    Procedure: Total Laparoscopic Hysterectomy, bilateral salpingectomy    Findings: markedly enlarged fibroid uterus, normal appearing tubes and ovaries. Normal pelvic and abdominal anatomy otherwise. Estimated Blood Loss: 25cc    Drains: none    Pathology /Specimens:     ID Type Source Tests Collected by Time Destination   1 : Uterus, cervix, bilateral fallopian tubes Preservative Uterus with Bilateral Fallopian Tubes  Cee Thayer MD 6/21/2019 0932 Pathology       DVT Prophylaxis: [facility]    Antibiotic Prophylaxis: Ancef    After understanding the risks, benefits, and alternatives to the proposed procedure, the patient was taken to the operating room, where she was administered general anesthesia in the supine position. She was then placed in the dorsal lithotomy position and prepped and draped in usual sterile fashion. A dodge catheter is placed. A sidearm speculum is introduced into the vagina. There cervix is grasped with a single tooth tenaculum. A Memetalesare uterine manipulator is placed and the balloon inflated with air. Gloves are changed and attention is turned to the abdomen. A Murphy's point incision is made, and access is gained using the optiview technique with a 5 mm trocar. Pneumoperitoneum is obtained and intraabdominal placement is verified. There was no bleeding and no evidence of injury to the bowel. 5 mm incisions were then made in the left and right lower quadrants and 5 mm ports placed in each of those under direct visualization. Findings were noted as above. The articulating EnSeal was used. The ureters were identified on either side.  On the left hand side, the tube and uteroovarian ligament was clamped and divided using the EnSeal. The dissection was then taken down through the round ligament. The anterior peritoneum was incised at the midline. The bladder was dissected off the lower uterine segment and cervix. Posterior peritoneum was taken down, skeletonizing the uterine arteries. The uterine arteries were clamped, coagulated and cut across the ascending branches using EnSeal. Cardinal ligament was developed. The same steps were followed on the right side. The fornices of the vagina are identified via the VCare cup. Colpotomy was made with the Harmonic Hook and carried around the VCare cup. When the specimen was free, it was delivered through the vagina. The bilateral mesosalpinges are divided with the EnSeal to remove both fallopian tubes which are then withdrawn through the trocar. The vaginal cuff was then closed with a running suture of 2-0 Stratafix with care taken to incorporate the angles of the cuff on each side. A second stitch of the same material is doubled back across the length of the closure. Hemostasis was achieved. The pelvis was irrigated with copious amounts of saline and suctioned away. Hemostasis was assured. The left and right lower trocars were removed under direct visualization. Once the pneumoperitoneum was completely reduced and hemostasis is still effective, the final trocar was removed. Skin of all incisions was closed with 4-0 Monocryl in a subcuticular closure. 0.5% Marcaine with epinephrine is injected at each incision site. Dermabond was applied to the skin. All sponge, lap, needle, and instrument counts were correct times two. Subsequently, the patient was cleaned up, the dodge was removed, and she was returned to the supine position, awakened, and taken to the recovery room in stable condition.      Signed By: Sherie Scott MD

## 2019-06-21 NOTE — PERIOP NOTES
Manju Paws applied at this time and set to appropriate temperature. Patient given remote and instructed on use. Will continue to monitor.

## 2019-06-21 NOTE — ANESTHESIA PREPROCEDURE EVALUATION
Relevant Problems   No relevant active problems       Anesthetic History     PONV          Review of Systems / Medical History  Patient summary reviewed, nursing notes reviewed and pertinent labs reviewed    Pulmonary  Within defined limits              Comments: Sarcoidosis   Neuro/Psych         Psychiatric history (anxiety)     Cardiovascular    Hypertension                   GI/Hepatic/Renal  Within defined limits              Endo/Other        Anemia     Other Findings   Comments: Menorrhagia  fibroids           Physical Exam    Airway  Mallampati: I  TM Distance: 4 - 6 cm  Neck ROM: normal range of motion   Mouth opening: Normal     Cardiovascular    Rhythm: regular  Rate: normal         Dental    Dentition: Caps/crowns  Comments: On molars   Pulmonary  Breath sounds clear to auscultation               Abdominal  GI exam deferred       Other Findings            Anesthetic Plan    ASA: 2  Anesthesia type: general    Monitoring Plan: BIS      Induction: Intravenous  Anesthetic plan and risks discussed with: Patient      Orformev IV preop.  Scopolamine patch/ PONV prophylaxis

## 2019-06-21 NOTE — ANESTHESIA POSTPROCEDURE EVALUATION
Procedure(s):  TOTAL LAPAROSCOPIC HYSTERECTOMY AND BILATERAL SALPINGECTOMY. general    Anesthesia Post Evaluation      Multimodal analgesia: multimodal analgesia used between 6 hours prior to anesthesia start to PACU discharge  Patient location during evaluation: bedside  Patient participation: complete - patient participated  Level of consciousness: awake  Pain score: 3  Airway patency: patent  Anesthetic complications: no  Cardiovascular status: acceptable  Respiratory status: acceptable  Hydration status: acceptable  Comments: Planned admission.   Post anesthesia nausea and vomiting:  none      Vitals Value Taken Time   /68 6/21/2019 11:28 AM   Temp 36.9 °C (98.5 °F) 6/21/2019 10:31 AM   Pulse 66 6/21/2019 11:28 AM   Resp 13 6/21/2019 11:28 AM   SpO2 100 % 6/21/2019 11:28 AM

## 2019-06-22 VITALS
TEMPERATURE: 99.7 F | OXYGEN SATURATION: 97 % | HEIGHT: 66 IN | HEART RATE: 90 BPM | BODY MASS INDEX: 30.44 KG/M2 | DIASTOLIC BLOOD PRESSURE: 85 MMHG | WEIGHT: 189.38 LBS | SYSTOLIC BLOOD PRESSURE: 144 MMHG | RESPIRATION RATE: 16 BRPM

## 2019-06-22 LAB
ERYTHROCYTE [DISTWIDTH] IN BLOOD BY AUTOMATED COUNT: 14.6 % (ref 11.5–14.5)
HCT VFR BLD AUTO: 28 % (ref 35–47)
HGB BLD-MCNC: 8.5 G/DL (ref 11.5–16)
MCH RBC QN AUTO: 25 PG (ref 26–34)
MCHC RBC AUTO-ENTMCNC: 30.4 G/DL (ref 30–36.5)
MCV RBC AUTO: 82.4 FL (ref 80–99)
NRBC # BLD: 0 K/UL (ref 0–0.01)
NRBC BLD-RTO: 0 PER 100 WBC
PLATELET # BLD AUTO: 326 K/UL (ref 150–400)
PMV BLD AUTO: 9.9 FL (ref 8.9–12.9)
RBC # BLD AUTO: 3.4 M/UL (ref 3.8–5.2)
WBC # BLD AUTO: 8.1 K/UL (ref 3.6–11)

## 2019-06-22 PROCEDURE — 99218 HC RM OBSERVATION: CPT

## 2019-06-22 PROCEDURE — 36415 COLL VENOUS BLD VENIPUNCTURE: CPT

## 2019-06-22 PROCEDURE — 85027 COMPLETE CBC AUTOMATED: CPT

## 2019-06-22 PROCEDURE — 77010033678 HC OXYGEN DAILY

## 2019-06-22 PROCEDURE — 94760 N-INVAS EAR/PLS OXIMETRY 1: CPT

## 2019-06-22 PROCEDURE — 74011250637 HC RX REV CODE- 250/637: Performed by: OBSTETRICS & GYNECOLOGY

## 2019-06-22 RX ORDER — IBUPROFEN 800 MG/1
800 TABLET ORAL
Qty: 30 TAB | Refills: 1 | Status: SHIPPED | OUTPATIENT
Start: 2019-06-22

## 2019-06-22 RX ORDER — OXYCODONE AND ACETAMINOPHEN 5; 325 MG/1; MG/1
1 TABLET ORAL
Qty: 24 TAB | Refills: 0 | Status: SHIPPED | OUTPATIENT
Start: 2019-06-22 | End: 2019-06-27

## 2019-06-22 RX ADMIN — OXYCODONE AND ACETAMINOPHEN 1 TABLET: 5; 325 TABLET ORAL at 08:13

## 2019-06-22 RX ADMIN — LISINOPRIL 10 MG: 5 TABLET ORAL at 08:13

## 2019-06-22 RX ADMIN — IBUPROFEN 800 MG: 400 TABLET, FILM COATED ORAL at 02:00

## 2019-06-22 RX ADMIN — Medication 10 ML: at 05:51

## 2019-06-22 RX ADMIN — IBUPROFEN 800 MG: 400 TABLET, FILM COATED ORAL at 11:00

## 2019-06-22 NOTE — DISCHARGE INSTRUCTIONS
Laparoscopic Hysterectomy: What to Expect at [facility]    A hysterectomy is surgery to take out the uterus. In some cases, the ovaries and fallopian tubes also are taken out at the same time. You can expect to feel better and stronger each day, but you may need pain medicine for a week or two. You may get tired easily or have less energy than usual. The tiredness may last for several weeks after surgery. You will probably notice that your belly is swollen and puffy. This is common. The swelling will take several weeks to go down. You may take about 4 to 6 weeks to fully recover. It is important to avoid lifting while you are recovering so that you can heal.  This care sheet gives you a general idea about how long it will take for you to recover. But each person recovers at a different pace. Follow the steps below to get better as quickly as possible. How can you care for yourself at home? Activity    · Rest when you feel tired.     · Be active. Walking is a good choice.     · Allow the area to heal. Don't move quickly or lift anything heavy until you are feeling better.     · You may shower 24 to 48 hours after surgery, if your doctor okays it. Pat the incision dry. Do not take a bath for the first 2 weeks, or until your doctor tells you it is okay.     · Ask your doctor when it is okay for you to have sex. Diet    · You can eat your normal diet. If your stomach is upset, try bland, low-fat foods like plain rice, broiled chicken, toast, and yogurt.     · If your bowel movements are not regular right after surgery, try to avoid constipation and straining. Drink plenty of water. Your doctor may suggest fiber, a stool softener, or a mild laxative. Medicines    · Your doctor will tell you if and when you can restart your medicines.  He or she will also give you instructions about taking any new medicines.     · If you take blood thinners, such as warfarin (Coumadin), clopidogrel (Plavix), or aspirin, be sure to talk to your doctor. He or she will tell you if and when to start taking those medicines again. Make sure that you understand exactly what your doctor wants you to do.     · Be safe with medicines. Read and follow all instructions on the label. ? If the doctor gave you a prescription medicine for pain, take it as prescribed. ? If you are not taking a prescription pain medicine, ask your doctor if you can take an over-the-counter medicine.     · If your doctor prescribed antibiotics, take them as directed. Do not stop taking them just because you feel better. You need to take the full course of antibiotics. Incision care    · You may have stitches over the cuts (incisions) the doctor made in your belly.     · If you have strips of tape on the cut (incision) the doctor made, leave the tape on for a week or until it falls off.     · Wash the area daily with warm, soapy water, and pat it dry. Don't use hydrogen peroxide or alcohol. They can slow healing.     · You may cover the area with a gauze bandage if it oozes fluid or rubs against clothing.     · Change the bandage every day. Other instructions    · You may have some light vaginal bleeding. Wear sanitary pads if needed. Do not douche or use tampons.     · Don't have sex until the doctor says it is okay. Follow-up care is a key part of your treatment and safety. Be sure to make and go to all appointments, and call your doctor if you are having problems. It's also a good idea to know your test results and keep a list of the medicines you take. When should you call for help? Call 911 anytime you think you may need emergency care.  For example, call if:    · You passed out (lost consciousness).     · You have chest pain, are short of breath, or cough up blood.    Call your doctor now or seek immediate medical care if:    · You have pain that does not get better after you take pain medicine.     · You cannot pass stoolsl or gas.     · You have vaginal discharge that has increased in amount or smells bad.     · You are sick to your stomach or cannot drink fluids.     · You have loose stitches, or your incision comes open.     · Bright red blood has soaked through the bandage over your incision.     · You have signs of infection, such as:  ? Increased pain, swelling, warmth, or redness. ? Red streaks leading from the incision. ? Pus draining from the incision. ? A fever.     · You have bright red vaginal bleeding that soaks one or more pads in an hour, or you have large clots.     · You have signs of a blood clot in your leg (called a deep vein thrombosis), such as:  ? Pain in your calf, back of the knee, thigh, or groin. ? Redness and swelling in your leg or groin.    Watch closely for changes in your health, and be sure to contact your doctor if you have any problems. Where can you learn more? Go to http://pritesh-katlyn.info/. Enter Q131 in the search box to learn more about \"Laparoscopic Hysterectomy: What to Expect at Home. \"  Current as of: May 14, 2018  Content Version: 11.9  © 1932-1241 [employer]. Care instructions adapted under license by [employer] (which disclaims liability or warranty for this information).  If you have questions about a medical condition or this instruction, always ask your healthcare professional. [address] any warranty or liability for your use of this information.

## 2019-06-22 NOTE — PROGRESS NOTES
Gynecology Post Operative Note    George Pruett    Assessment: Doing well post-op    Plan: Discharge home today with: Medications: ibuprofen, percocet and medications prior to admission Follow up: 2 weeks Diet: as tolerated Activity: no heavy lifting and pelvic rest    Post-op day 1 from Procedure(s):  TOTAL LAPAROSCOPIC HYSTERECTOMY AND BILATERAL SALPINGECTOMY  She is without significant complaints. Pain controlled on current medication. Voiding without difficulty. Patient is passing flatus. She is is tolerating her diet. Orders/Charges: Medium    Vitals:  Visit Vitals  /85   Pulse 90   Temp 99.7 °F (37.6 °C)   Resp 16   Ht 5' 6\" (1.676 m)   Wt 85.9 kg (189 lb 6 oz)   SpO2 97%   BMI 30.57 kg/m²     Temp (24hrs), Av.8 °F (37.1 °C), Min:98 °F (36.7 °C), Max:99.7 °F (37.6 °C)      Last 24hr Input/Output:    Intake/Output Summary (Last 24 hours) at 2019 0922  Last data filed at 2019 0811  Gross per 24 hour   Intake 2340 ml   Output 1575 ml   Net 765 ml          Exam:  Patient without distress. Lungs clear              Abdomen soft, bowel sounds present, expected tenderness. Incision dry and clean without erythema. Lower extremities are negative for swelling, cords, or tenderness.     Labs:   Lab Results   Component Value Date/Time    WBC 8.1 2019 04:58 AM    WBC 6.5 2019 11:27 AM    WBC 9.7 2010 07:30 AM    HGB 8.5 (L) 2019 04:58 AM    HGB 9.5 (L) 2019 11:27 AM    HGB 13.3 2010 07:30 AM    HCT 28.0 (L) 2019 04:58 AM    HCT 31.4 (L) 2019 11:27 AM    HCT 38.4 2010 07:30 AM    PLATELET 506  04:58 AM    PLATELET 996  11:27 AM    PLATELET 286  07:30 AM       Recent Results (from the past 24 hour(s))   CBC W/O DIFF    Collection Time: 19  4:58 AM   Result Value Ref Range    WBC 8.1 3.6 - 11.0 K/uL    RBC 3.40 (L) 3.80 - 5.20 M/uL    HGB 8.5 (L) 11.5 - 16.0 g/dL    HCT 28.0 (L) 35.0 - 47.0 %    MCV 82.4 80.0 - 99.0 FL    MCH 25.0 (L) 26.0 - 34.0 PG    MCHC 30.4 30.0 - 36.5 g/dL    RDW 14.6 (H) 11.5 - 14.5 %    PLATELET 120 627 - 775 K/uL    MPV 9.9 8.9 - 12.9 FL    NRBC 0.0 0  WBC    ABSOLUTE NRBC 0.00 0.00 - 0.01 K/uL

## 2019-06-22 NOTE — PROGRESS NOTES
General Surgery End of Shift Nursing Note    Bedside shift change report given to [facility] (oncoming nurse) by Karmen Lara RN (offgoing nurse). Report included the following information SBAR, Kardex, Procedure Summary, Intake/Output, MAR and Recent Results. Shift worked:   7p-7a   Summary of shift:    Pt slept most of the shift. Medicated for c/o pain as requested. Uneventful evening. Issues for physician to address:   N/A     Number times ambulated in hallway past shift: 0    Number of times OOB to chair past shift: 2    Pain Management:  Current medication: Percocet  Patient states pain is manageable on current pain medication: YES    GI:    Current diet:  DIET GI LITE (POST SURGICAL)    Tolerating current diet: YES  Passing flatus: NO  Last Bowel Movement: several days ago    Respiratory:    Incentive Spirometer at bedside: YES  Patient instructed on use: YES    Patient Safety:    Falls Score: 1  Bed Alarm On? No  Sitter?  No    Sukhjinder Erazo RN

## 2019-06-22 NOTE — DISCHARGE SUMMARY
Gynecology Discharge Summary     Patient ID:  [facility]  [id number]  89 y.o.  1970    Admit date: 6/21/2019    Discharge date: 6/22/2019     Admission Diagnoses:   Patient Active Problem List   Diagnosis Code    Fibroids D21.9    S/P laparoscopic hysterectomy Z90.710       Discharge Diagnoses: There are no discharge diagnoses documented for the most recent discharge. Patient Active Problem List   Diagnosis Code    Fibroids D21.9    S/P laparoscopic hysterectomy Z90.710       Procedures for this admission: Procedure(s):  TOTAL LAPAROSCOPIC HYSTERECTOMY AND BILATERAL SALPINGECTOMY    Hospital Course:    Disposition: home    Discharged Condition: stable            Patient Instructions:   Current Discharge Medication List      START taking these medications    Details   ibuprofen (MOTRIN) 800 mg tablet Take 1 Tab by mouth every eight (8) hours as needed for Pain. Qty: 30 Tab, Refills: 1      oxyCODONE-acetaminophen (PERCOCET) 5-325 mg per tablet Take 1 Tab by mouth every four (4) hours as needed for Pain for up to 5 days. Max Daily Amount: 6 Tabs. Qty: 24 Tab, Refills: 0    Associated Diagnoses: S/P laparoscopic hysterectomy         CONTINUE these medications which have NOT CHANGED    Details   lisinopril (PRINIVIL, ZESTRIL) 10 mg tablet Take 10 mg by mouth daily. ferrous sulfate (IRON) 325 mg (65 mg iron) tablet Take  by mouth two (2) times a day. ALPRAZolam (XANAX) 0.5 mg tablet Take 0.5 mg by mouth three (3) times daily as needed for Anxiety. STOP taking these medications       medroxyPROGESTERone (PROVERA) 10 mg tablet Comments:   Reason for Stopping:             Activity: Activity as tolerated, No sex for 6 weeks and No heavy lifting for 6 weeks  Diet: Regular Diet  Wound Care: Keep wound clean and dry    Follow-up with Dr. Tom Barrientos in 2 weeks.     Signed:  Julianne Hanna MD  6/22/2019  9:26 AM

## 2019-06-22 NOTE — PROGRESS NOTES
Bedside shift change report given to Michi Woodward (oncoming nurse) by Cathy Michaels (offgoing nurse). Report included the following information SBAR, Kardex, OR Summary, Procedure Summary, Intake/Output, MAR, Recent Results.

## 2019-06-22 NOTE — PROGRESS NOTES
I have reviewed discharge instructions with the patient. The patient verbalized understanding. Follow up appoinments and care at home reviewed with patient. Pt discharged with 2 scripts for pain management; ibuprofen and percocet. IV removed and pt discharged with belongings. Pt has no questions at this time.

## 2019-07-24 ENCOUNTER — DOCUMENTATION ONLY (OUTPATIENT)
Dept: SURGERY | Age: 49
End: 2019-07-24

## 2019-07-24 ENCOUNTER — OFFICE VISIT (OUTPATIENT)
Dept: SURGERY | Age: 49
End: 2019-07-24

## 2019-07-24 VITALS
HEIGHT: 66 IN | HEART RATE: 68 BPM | DIASTOLIC BLOOD PRESSURE: 85 MMHG | WEIGHT: 197 LBS | SYSTOLIC BLOOD PRESSURE: 151 MMHG | BODY MASS INDEX: 31.66 KG/M2

## 2019-07-24 DIAGNOSIS — D24.2 PAPILLOMA OF LEFT BREAST: ICD-10-CM

## 2019-07-24 DIAGNOSIS — Z91.89 AT HIGH RISK FOR BREAST CANCER: Primary | ICD-10-CM

## 2019-07-24 NOTE — LETTER
2019 9:49 AM 
 
Patient:  Basim Rodrigez YOB: 1970 Date of Visit: 2019 Dear Gladis Dhillon MD 
[address] [address] VIA In Basket 
 : Thank you for referring Ms. Srinath Duran to me for evaluation/treatment. Below are the relevant portions of my assessment and plan of care. HISTORY OF PRESENT ILLNESS Basim Rodrigez is a 47 y.o. female. HPI NEW patient consult referred by  Dr. Arvind Giles for LEFT breast papilloma. She began having nipple discharge after starting on birth control used to control heavy periods. Diagnostic imaging done in late March, leading to biopsy in , revealing papilloma. Denies any palpable lumps or skin changed. No discharge since biopsy. OB History Obstetric Comments Menarche 14, LMP age 51, # of children 1, age of 4st delivery 26, Hysterectomy/oophorectomy yes/no, Breast bx LEFT-2019 , history of breast feeding no, BCP yes, Hormone therapy no Family History: MGM, breast cancer, age 48 @ dx,  MGF, stomach cancer, age 63, Imaging from [address]: 
Mammogram, BILAT 3D screening 10/25/18, BIRADS 1 LEFT dx mammogram and LEFT breast U/S, 3/28/19, BIRADS 4 LEFT breast biopsy, 4/15/19: Pathology-Papilloma, without atypia Past Medical History:  
Diagnosis Date  Anemia  Anxiety  Hypertension  Nausea & vomiting  Sarcoidosis   
 in remission per patient on 19 Past Surgical History:  
Procedure Laterality Date  HX CHOLECYSTECTOMY  HX GYN    
   HX HEENT    
 tonsils  HX HYSTERECTOMY  2019 Social History Socioeconomic History  Marital status:  Spouse name: Not on file  Number of children: Not on file  Years of education: Not on file  Highest education level: Not on file Occupational History  Not on file Social Needs  Financial resource strain: Not on file  Food insecurity: Worry: Not on file Inability: Not on file  Transportation needs:  
  Medical: Not on file Non-medical: Not on file Tobacco Use  Smoking status: Never Smoker  Smokeless tobacco: Never Used Substance and Sexual Activity  Alcohol use: Yes Comment: occasionally  Drug use: Not Currently  Sexual activity: Not on file Lifestyle  Physical activity:  
  Days per week: Not on file Minutes per session: Not on file  Stress: Not on file Relationships  Social connections:  
  Talks on phone: Not on file Gets together: Not on file Attends Sabianist service: Not on file Active member of club or organization: Not on file Attends meetings of clubs or organizations: Not on file Relationship status: Not on file  Intimate partner violence:  
  Fear of current or ex partner: Not on file Emotionally abused: Not on file Physically abused: Not on file Forced sexual activity: Not on file Other Topics Concern  Not on file Social History Narrative  Not on file OB History None Obstetric Comments Menarche 14, LMP age 51, # of children 1, age of 4st delivery 26, Hysterectomy/oophorectomy yes/no, Breast bx LEFT-4/2019 , history of breast feeding no, BCP yes, Hormone therapy no Current Outpatient Medications:  
  ibuprofen (MOTRIN) 800 mg tablet, Take 1 Tab by mouth every eight (8) hours as needed for Pain., Disp: 30 Tab, Rfl: 1   ALPRAZolam (XANAX) 0.5 mg tablet, Take 0.5 mg by mouth three (3) times daily as needed for Anxiety. , Disp: , Rfl:  
  lisinopril (PRINIVIL, ZESTRIL) 10 mg tablet, Take 10 mg by mouth daily. , Disp: , Rfl:  
  ferrous sulfate (IRON) 325 mg (65 mg iron) tablet, Take  by mouth two (2) times a day., Disp: , Rfl:  
Allergies Allergen Reactions  Prednisone Other (comments)  
  sensitive Review of Systems Constitutional: Positive for malaise/fatigue. Gastrointestinal: Positive for abdominal pain. Psychiatric/Behavioral: The patient is nervous/anxious. All other systems reviewed and are negative. Physical Exam  
Constitutional: She is oriented to person, place, and time. She appears well-developed and well-nourished. HENT:  
Head: Normocephalic. Eyes: EOM are normal.  
Neck: Neck supple. Cardiovascular: Intact distal pulses. Pulmonary/Chest: Effort normal and breath sounds normal. Right breast exhibits no inverted nipple, no mass, no nipple discharge, no skin change and no tenderness. Left breast exhibits no inverted nipple, no mass, no nipple discharge, no skin change and no tenderness. Breasts are symmetrical.  
Musculoskeletal: Normal range of motion. Lymphadenopathy:  
  She has no cervical adenopathy. She has no axillary adenopathy. Neurological: She is alert and oriented to person, place, and time. Skin: Skin is warm and dry. Nursing note and vitals reviewed. ASSESSMENT and PLAN 
  ICD-10-CM ICD-9-CM 1. At high risk for breast cancer Z91.89 V49.89 MRI BREAST BI W WO CONT 2. Papilloma of left breast D24.2 1   
 
46 yo female with papilloma Her lifetime risk is 20.4% which qualifies for screening mri. Will shedule. If normal, would not excised papilloma. If you have questions, please do not hesitate to call me. I look forward to following Ms. Jacque Pina along with you. Sincerely, Marimar Adams MD

## 2019-07-24 NOTE — PROGRESS NOTES
Type of Film: [x] CD [] FILMS  Type of Test: [] MRI [x] MAMMO  From: [facility]  Given to: Simon Loaiza, will bring to [facility].   LOCATION  To be Downloaded into PACS:  YES

## 2019-07-24 NOTE — PATIENT INSTRUCTIONS

## 2019-07-25 ENCOUNTER — DOCUMENTATION ONLY (OUTPATIENT)
Dept: SURGERY | Age: 49
End: 2019-07-25

## 2019-07-25 NOTE — PROGRESS NOTES
CD with mammogram imaging given to Elza Rosario in [facility] at [facility].

## 2019-07-26 NOTE — COMMUNICATION BODY
HISTORY OF PRESENT ILLNESS  Arnulfo Lara is a 47 y.o. female. HPI NEW patient consult referred by  Dr. Rhonda Corral for LEFT breast papilloma. She began having nipple discharge after starting on birth control used to control heavy periods. Diagnostic imaging done in late March, leading to biopsy in , revealing papilloma. Denies any palpable lumps or skin changed. No discharge since biopsy.     OB History   Obstetric Comments   Menarche 14, LMP age 51, # of children 1, age of 4st delivery 26, Hysterectomy/oophorectomy yes/no, Breast bx LEFT-2019 , history of breast feeding no, BCP yes, Hormone therapy no         Family History:  MGM, breast cancer, age 48 @ dx,   MGF, stomach cancer, age 63,      [address]aging [address]:  Mammogram, BILAT 3D screening 10/25/18, BIRADS 1  LEFT dx mammogram and LEFT breast U/S, 3/28/19, BIRADS 4  LEFT breast biopsy, 4/15/19: Pathology-Papilloma, without atypia    Past Medical History:   Diagnosis Date    Anemia     Anxiety     Hypertension     Nausea & vomiting     Sarcoidosis     in remission per patient on 19     Past Surgical History:   Procedure Laterality Date    HX CHOLECYSTECTOMY      HX GYN          HX HEENT      tonsils    HX HYSTERECTOMY  2019     Social History     Socioeconomic History    Marital status:      Spouse name: Not on file    Number of children: Not on file    Years of education: Not on file    Highest education level: Not on file   Occupational History    Not on file   Social Needs    Financial resource strain: Not on file    Food insecurity:     Worry: Not on file     Inability: Not on file    Transportation needs:     Medical: Not on file     Non-medical: Not on file   Tobacco Use    Smoking status: Never Smoker    Smokeless tobacco: Never Used   Substance and Sexual Activity    Alcohol use: Yes     Comment: occasionally    Drug use: Not Currently    Sexual activity: Not on file   Lifestyle  Physical activity:     Days per week: Not on file     Minutes per session: Not on file    Stress: Not on file   Relationships    Social connections:     Talks on phone: Not on file     Gets together: Not on file     Attends Hoahaoism service: Not on file     Active member of club or organization: Not on file     Attends meetings of clubs or organizations: Not on file     Relationship status: Not on file    Intimate partner violence:     Fear of current or ex partner: Not on file     Emotionally abused: Not on file     Physically abused: Not on file     Forced sexual activity: Not on file   Other Topics Concern    Not on file   Social History Narrative    Not on file     OB History    None      Obstetric Comments   Menarche 14, LMP age 51, # of children 1, age of 4st delivery 26, Hysterectomy/oophorectomy yes/no, Breast bx LEFT-4/2019 , history of breast feeding no, BCP yes, Hormone therapy no             Current Outpatient Medications:     ibuprofen (MOTRIN) 800 mg tablet, Take 1 Tab by mouth every eight (8) hours as needed for Pain., Disp: 30 Tab, Rfl: 1    ALPRAZolam (XANAX) 0.5 mg tablet, Take 0.5 mg by mouth three (3) times daily as needed for Anxiety. , Disp: , Rfl:     lisinopril (PRINIVIL, ZESTRIL) 10 mg tablet, Take 10 mg by mouth daily. , Disp: , Rfl:     ferrous sulfate (IRON) 325 mg (65 mg iron) tablet, Take  by mouth two (2) times a day., Disp: , Rfl:   Allergies   Allergen Reactions    Prednisone Other (comments)     sensitive       Review of Systems   Constitutional: Positive for malaise/fatigue. Gastrointestinal: Positive for abdominal pain. Psychiatric/Behavioral: The patient is nervous/anxious. All other systems reviewed and are negative. Physical Exam   Constitutional: She is oriented to person, place, and time. She appears well-developed and well-nourished. HENT:   Head: Normocephalic. Eyes: EOM are normal.   Neck: Neck supple. Cardiovascular: Intact distal pulses. Pulmonary/Chest: Effort normal and breath sounds normal. Right breast exhibits no inverted nipple, no mass, no nipple discharge, no skin change and no tenderness. Left breast exhibits no inverted nipple, no mass, no nipple discharge, no skin change and no tenderness. Breasts are symmetrical.   Musculoskeletal: Normal range of motion. Lymphadenopathy:     She has no cervical adenopathy. She has no axillary adenopathy. Neurological: She is alert and oriented to person, place, and time. Skin: Skin is warm and dry. Nursing note and vitals reviewed. ASSESSMENT and PLAN    ICD-10-CM ICD-9-CM    1. At high risk for breast cancer Z91.89 V49.89 MRI BREAST BI W WO CONT   2. Papilloma of left breast D24.2 1      48 yo female with papilloma   Her lifetime risk is 20.4% which qualifies for screening mri. Will shedule. If normal, would not excised papilloma.

## 2019-07-31 ENCOUNTER — HOSPITAL ENCOUNTER (OUTPATIENT)
Dept: MRI IMAGING | Age: 49
Discharge: HOME | End: 2019-07-31
Attending: SURGERY
Payer: COMMERCIAL

## 2019-07-31 DIAGNOSIS — Z91.89 AT HIGH RISK FOR BREAST CANCER: ICD-10-CM

## 2019-07-31 DIAGNOSIS — D24.2 PAPILLOMA OF LEFT BREAST: ICD-10-CM

## 2019-07-31 LAB — CREAT BLD-MCNC: 0.5 MG/DL (ref 0.6–1.3)

## 2019-07-31 PROCEDURE — 77049 MRI BREAST C-+ W/CAD BI: CPT

## 2019-07-31 PROCEDURE — 74011250636 HC RX REV CODE- 250/636: Performed by: SURGERY

## 2019-07-31 PROCEDURE — A9585 GADOBUTROL INJECTION: HCPCS | Performed by: SURGERY

## 2019-07-31 PROCEDURE — 82565 ASSAY OF CREATININE: CPT

## 2019-07-31 RX ADMIN — GADOBUTROL 9 ML: 604.72 INJECTION INTRAVENOUS at 15:02

## 2019-08-13 ENCOUNTER — DOCUMENTATION ONLY (OUTPATIENT)
Dept: SURGERY | Age: 49
End: 2019-08-13

## 2022-03-18 PROBLEM — Z90.710 S/P LAPAROSCOPIC HYSTERECTOMY: Status: ACTIVE | Noted: 2019-06-21

## 2022-03-19 PROBLEM — D21.9 FIBROIDS: Status: ACTIVE | Noted: 2019-06-21

## 2023-06-30 ENCOUNTER — HOSPITAL ENCOUNTER (OUTPATIENT)
Facility: HOSPITAL | Age: 53
Discharge: HOME | End: 2023-06-30
Payer: COMMERCIAL

## 2023-06-30 ENCOUNTER — HOSPITAL ENCOUNTER (OUTPATIENT)
Facility: HOSPITAL | Age: 53
End: 2023-06-30
Payer: COMMERCIAL

## 2023-06-30 DIAGNOSIS — Z12.31 SCREENING MAMMOGRAM FOR HIGH-RISK PATIENT: ICD-10-CM

## 2023-06-30 DIAGNOSIS — Z91.89 AT HIGH RISK FOR BREAST CANCER: ICD-10-CM

## 2023-06-30 PROCEDURE — C8908 MRI W/O FOL W/CONT, BREAST,: HCPCS

## 2023-06-30 PROCEDURE — 77063 BREAST TOMOSYNTHESIS BI: CPT

## 2023-06-30 PROCEDURE — A9579 GAD-BASE MR CONTRAST NOS,1ML: HCPCS

## 2023-06-30 PROCEDURE — 2580000003 HC RX 258: Performed by: SURGERY

## 2023-06-30 PROCEDURE — 6360000004 HC RX CONTRAST MEDICATION

## 2023-06-30 RX ORDER — 0.9 % SODIUM CHLORIDE 0.9 %
100 INTRAVENOUS SOLUTION INTRAVENOUS ONCE
Status: COMPLETED | OUTPATIENT
Start: 2023-06-30 | End: 2023-06-30

## 2023-06-30 RX ORDER — SODIUM CHLORIDE 0.9 % (FLUSH) 0.9 %
10 SYRINGE (ML) INJECTION ONCE
Status: COMPLETED | OUTPATIENT
Start: 2023-06-30 | End: 2023-06-30

## 2023-06-30 RX ADMIN — SODIUM CHLORIDE 100 ML: 9 INJECTION, SOLUTION INTRAVENOUS at 09:38

## 2023-06-30 RX ADMIN — GADOTERIDOL 18 ML: 279.3 INJECTION, SOLUTION INTRAVENOUS at 09:36

## 2023-06-30 RX ADMIN — SODIUM CHLORIDE, PRESERVATIVE FREE 10 ML: 5 INJECTION INTRAVENOUS at 09:38

## 2025-08-25 ENCOUNTER — OFFICE VISIT (OUTPATIENT)
Age: 55
End: 2025-08-25
Payer: COMMERCIAL

## 2025-08-25 VITALS — BODY MASS INDEX: 31.02 KG/M2 | WEIGHT: 193 LBS | HEIGHT: 66 IN

## 2025-08-25 DIAGNOSIS — N60.11 FIBROCYSTIC BREAST CHANGES OF BOTH BREASTS: Primary | ICD-10-CM

## 2025-08-25 DIAGNOSIS — N60.12 FIBROCYSTIC BREAST CHANGES OF BOTH BREASTS: Primary | ICD-10-CM

## 2025-08-25 DIAGNOSIS — Z80.3 FAMILY HISTORY OF BREAST CANCER: ICD-10-CM

## 2025-08-25 DIAGNOSIS — Z12.31 ENCOUNTER FOR SCREENING MAMMOGRAM FOR BREAST CANCER: ICD-10-CM

## 2025-08-25 PROCEDURE — 99213 OFFICE O/P EST LOW 20 MIN: CPT | Performed by: NURSE PRACTITIONER

## (undated) DEVICE — NEEDLE HYPO 25GA L1.5IN BVL ORIENTED ECLIPSE

## (undated) DEVICE — VCARE MEDIUM, UTERINE MANIPULATOR, VAGINAL-CERVICAL-AHLUWALIA'S-RETRACTOR-ELEVATOR: Brand: VCARE

## (undated) DEVICE — SUTURE MCRYL SZ 4-0 L27IN ABSRB UD L19MM PS-2 1/2 CIR PRIM Y426H

## (undated) DEVICE — GOWN,SIRUS,FABRNF,XL,20/CS: Brand: MEDLINE

## (undated) DEVICE — TRAY CATH OD16FR SIL URIN M STATLOK STBL DEV SURSTP

## (undated) DEVICE — PAD 05IN BASE 3IN PEAK M DENS CONVOLUTED FOAM

## (undated) DEVICE — SYR 5ML 1/5 GRAD LL NSAF LF --

## (undated) DEVICE — ELECTRO LUBE IS A SINGLE PATIENT USE DEVICE THAT IS INTENDED TO BE USED ON ELECTROSURGICAL ELECTRODES TO REDUCE STICKING.: Brand: KEY SURGICAL ELECTRO LUBE

## (undated) DEVICE — SOLUTION IV 1000ML 0.9% SOD CHL

## (undated) DEVICE — TROCAR: Brand: KII FIOS FIRST ENTRY

## (undated) DEVICE — INFECTION CONTROL KIT SYS

## (undated) DEVICE — BLADE HARM SCALP HK DISSECTING --

## (undated) DEVICE — TROCAR: Brand: KII SLEEVE

## (undated) DEVICE — PAD,SANITARY,11 IN,MAXI,N-STRL,IND WRAP: Brand: MEDLINE

## (undated) DEVICE — COVER LT HNDL PLAS RIG 1 PER PK

## (undated) DEVICE — TRI-LUMEN FILTERED TUBE SET WITH ACTIVATED CHARCOAL FILTER: Brand: AIRSEAL

## (undated) DEVICE — SOLUTION LACTATED RINGERS INJECTION USP

## (undated) DEVICE — STERILE POLYISOPRENE POWDER-FREE SURGICAL GLOVES: Brand: PROTEXIS

## (undated) DEVICE — SET ADMIN 16ML TBNG L100IN 2 Y INJ SITE IV PIGGY BK DISP

## (undated) DEVICE — Device

## (undated) DEVICE — APPLICATOR BNDG 1MM ADH PREMIERPRO EXOFIN

## (undated) DEVICE — KENDALL DL ECG CABLE AND LEAD WIRE SYSTEM, 5-LEAD, SINGLE PATIENT USE: Brand: KENDALL

## (undated) DEVICE — SEALER TISS L35CM DIA5MM CRV JAW ARTC ADV BPLR ENSEAL G2

## (undated) DEVICE — (D)PREP SKN CHLRAPRP APPL 26ML -- CONVERT TO ITEM 371833

## (undated) DEVICE — VISUALIZATION SYSTEM: Brand: CLEARIFY

## (undated) DEVICE — TOWEL SURG W17XL27IN STD BLU COT NONFENESTRATED PREWASHED

## (undated) DEVICE — TRAY PREP DRY W/ PREM GLV 2 APPL 6 SPNG 2 UNDPD 1 OVERWRAP

## (undated) DEVICE — SURGICAL PROCEDURE PACK GYN LAPAROSCOPY CUST SMH LF

## (undated) DEVICE — ADPT HND SWCH HARM SCALP DISP --

## (undated) DEVICE — SUTURE STRATAFIX SPRL PDS + SZ 2-0 L9IN ABSRB VLT CT-1 SXPP1B456

## (undated) DEVICE — CONTINU-FLO SOLUTION SET, 2 INJECTION SITES, MALE LUER LOCK ADAPTER WITH RETRACTABLE COLLAR, LARGE BORE STOPCOCK WITH ROTATING MALE LUER LOCK EXTENSION SET, 2 INJECTION SITES, MALE LUER LOCK ADAPTER WITH RETRACTABLE COLLAR: Brand: INTERLINK/CONTINU-FLO